# Patient Record
Sex: FEMALE | Race: BLACK OR AFRICAN AMERICAN | NOT HISPANIC OR LATINO | Employment: OTHER | URBAN - METROPOLITAN AREA
[De-identification: names, ages, dates, MRNs, and addresses within clinical notes are randomized per-mention and may not be internally consistent; named-entity substitution may affect disease eponyms.]

---

## 2024-05-10 ENCOUNTER — HOSPITAL ENCOUNTER (OUTPATIENT)
Dept: TELEMEDICINE | Facility: HOSPITAL | Age: 89
Discharge: HOME | End: 2024-05-10
Payer: MEDICARE

## 2024-05-10 DIAGNOSIS — I63.512 STROKE DUE TO OCCLUSION OF LEFT MIDDLE CEREBRAL ARTERY: Primary | ICD-10-CM

## 2024-05-10 PROBLEM — I63.312 CEREBROVASCULAR ACCIDENT (CVA) DUE TO THROMBOSIS OF LEFT MIDDLE CEREBRAL ARTERY: Status: ACTIVE | Noted: 2024-05-10

## 2024-05-10 PROBLEM — G81.91 RIGHT HEMIPLEGIA: Status: ACTIVE | Noted: 2024-05-10

## 2024-05-10 PROBLEM — I10 PRIMARY HYPERTENSION: Status: ACTIVE | Noted: 2024-05-10

## 2024-05-10 PROCEDURE — G0508 CRIT CARE TELEHEA CONSULT 60: HCPCS | Mod: GT,,, | Performed by: STUDENT IN AN ORGANIZED HEALTH CARE EDUCATION/TRAINING PROGRAM

## 2024-05-10 NOTE — SUBJECTIVE & OBJECTIVE
HPI:  89 y.o. female with a PMHx significant HTN, DM, prior stroke presenting with left MCA syndrome. Last seen completely normal last night     Fell this morning.   Neglect right side  No speech  Eye gazed to the left  No sensation  Right  facial droopo    No bleed    0615 made it to the bathroom, talking normally    Images personally reviewed and interpreted:  Study: {Study:60889}  Study Interpretation: ***     Assessment and plan:  ***    Lytics recommendation: {THROMBOLYTIC THERAPY RECOMMENDATION:65283}  Thrombectomy recommendation: {INTERVENTIONAL REVASCULARIZATION CANDIDATE:33714}  Placement recommendation: {TELESTROKE DISPOSITION RECOMMENDATION:37815}

## 2024-05-10 NOTE — TELEMEDICINE CONSULT
[facility]  Vascular Neurology  Comprehensive Stroke Center  TeleVascular Neurology Acute Consultation Note        Consult Information  Consults    Consulting Provider: JESSICA ROBLEDO   Current Providers  No providers found    Patient Location: [facility] Emergency Department    Spoke hospital nurse at bedside with patient assisting consultant.  Patient information was obtained from primary team.       Stroke Documentation  Acute Stroke Times   Last Known Normal Date: 05/09/24  Unknown Normal Time: Unknown Time  Symptom Onset Date: 05/09/24  Unknown Symptom Onset Time: Unknown Time  Stroke Team Called Date: 05/10/24  Stroke Team Called Time: 0739  Stroke Team Arrival Date: 05/10/24  Stroke Team Arrival Time: 0744  CT Interpretation Time: 0749  Thrombolytic Therapy Recommended: No    NIH Scale:  Interval: baseline  1a. Level of Consciousness: 1-->Not alert, but arousable by minor stimulation to obey, answer, or respond  1b. LOC Questions: 2-->Answers neither question correctly  1c. LOC Commands: 2-->Performs neither task correctly  2. Best Gaze: 2-->Forced deviation, or total gaze paresis not overcome by the oculocephalic maneuver  3. Visual: 1-->Partial hemianopia  4. Facial Palsy: 2-->Partial paralysis (total or near-total paralysis of lower face)  5a. Motor Arm, Left: 1-->Drift, limb holds 90 (or 45) degrees, but drifts down before full 10 seconds, does not hit bed or other support  5b. Motor Arm, Right: 4-->No movement  6a. Motor Leg, Left: 1-->Drift, leg falls by the end of the 5-sec period but does not hit bed  6b. Motor Leg, Right: 4-->No movement  7. Limb Ataxia: 0-->Absent  8. Sensory: 2-->Severe to total sensory loss, patient is not aware of being touched in the face, arm, and leg  9. Best Language: 3-->Mute, global aphasia, no usable speech or auditory comprehension  10. Dysarthria: 2-->Severe dysarthria, patients speech is so  slurred as to be unintelligible in the absence of or out of proportion to any dysphasia, or is mute/anarthric  11. Extinction and Inattention (formerly Neglect): 2-->Profound aaron-inattention/extinction more than 1 modality  Total (NIH Stroke Scale): 29      Modified Jamil: Score: 0  [place] Coma Scale:     ABCD2 Score:    [id number] Score:    HAS -BLED Score:    ICH Score:    Hunt & Bonner Classification:      There were no vitals taken for this visit.    Van Positive    Medical Decision Making  HPI:  89 y.o. female with a PMHx significant HTN, DM, prior stroke presenting with left MCA syndrome. Last seen completely normal last night per EMS. Patient did not feel well this morning, but was observed going to the bathroom, talking normally around 0615 this morning by family. Later found on the ground with symptoms.     She is on xarelto, last taken last night.      Images personally reviewed and interpreted:  Study: CT head  Study Interpretation: No acute intracranial hemorrhage. Hyperdense left MCA. Favorable ASPECTS.      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   Thrombectomy recommendation: Likely yes given her exam and CTH, though CTA head/neck is pending.   Placement recommendation: Patient will be transferred to the nearest thrombectomy-capable center (likely [place]). Recommend HOB flat if tolerated and IVF bolus prior to transfer. Allow for permissive HTN, SBP < 220.             ROS  Physical Exam  No past medical history on file.  No past surgical history on file.  No family history on file.    Diagnoses  Problem Noted   Stroke Due to Occlusion of Left Middle Cerebral Artery 5/10/2024       Madison Roberts MD      Emergent/Acute neurological consultation requested by spoke provider due to critical concerns for possible cerebrovascular event that could result in permanent  loss of neurologic/bodily function, severe disability or death of this patient.  Immediate/timely evaluation by a highly prepared expert is paramount for optimal outcomes  High risk for neurological deterioration if not properly diagnosed  High risk for neurological deterioration if not treated promplty/as soon as possible  Complex diagnostic evaluation may be required (advanced imaging)  High risk treatment options (thrombolytics and/or thrombectomy)    Patient care was coordinated with spoke provider, including but not limted to    Discussing likely diagnosis/etiology of symptoms  Making recommendations for further diagnostic studies  Making recommendations for intravenous thrombolytics or other advanced therapies  Making recommendations for disposition (admission/transfer for higher level of care)

## 2024-05-13 PROBLEM — G93.6 CYTOTOXIC CEREBRAL EDEMA: Status: ACTIVE | Noted: 2024-05-13

## 2024-05-13 PROBLEM — I25.10 CORONARY ARTERY DISEASE INVOLVING NATIVE CORONARY ARTERY OF NATIVE HEART WITHOUT ANGINA PECTORIS: Status: ACTIVE | Noted: 2024-05-13

## 2024-05-13 PROBLEM — G93.5 BRAIN COMPRESSION: Status: ACTIVE | Noted: 2024-05-13

## 2024-05-13 PROBLEM — R47.01 GLOBAL APHASIA: Status: ACTIVE | Noted: 2024-05-13

## 2024-05-13 PROBLEM — E78.5 HYPERLIPIDEMIA: Status: ACTIVE | Noted: 2024-05-13

## 2024-05-13 PROBLEM — E66.01 MORBID OBESITY DUE TO EXCESS CALORIES: Status: ACTIVE | Noted: 2024-05-13

## 2024-05-14 PROBLEM — E66.9 OBESITY (BMI 30-39.9): Status: ACTIVE | Noted: 2024-05-14

## 2024-05-14 PROBLEM — J44.9 CHRONIC OBSTRUCTIVE PULMONARY DISEASE: Status: ACTIVE | Noted: 2024-05-14

## 2024-05-14 PROBLEM — I25.10 CORONARY ARTERY DISEASE: Status: ACTIVE | Noted: 2024-05-14

## 2024-05-14 PROBLEM — Z95.1 HX OF CABG: Status: ACTIVE | Noted: 2024-05-14

## 2024-05-14 PROBLEM — R13.10 DYSPHAGIA: Status: ACTIVE | Noted: 2024-05-14

## 2024-05-14 PROBLEM — R14.0 ABDOMINAL DISTENSION: Status: ACTIVE | Noted: 2024-05-14

## 2024-05-14 PROBLEM — R64 INANITION: Status: ACTIVE | Noted: 2024-05-14

## 2024-05-14 PROBLEM — E13.69 OTHER SPECIFIED DIABETES MELLITUS WITH OTHER SPECIFIED COMPLICATION: Status: ACTIVE | Noted: 2024-05-14

## 2024-05-16 PROBLEM — K56.7 ILEUS, UNSPECIFIED: Status: ACTIVE | Noted: 2024-05-16

## 2024-05-21 PROBLEM — R09.02 OXYGEN DESATURATION: Status: ACTIVE | Noted: 2024-05-21

## 2024-05-23 PROBLEM — Z93.1 S/P PERCUTANEOUS ENDOSCOPIC GASTROSTOMY (PEG) TUBE PLACEMENT: Status: ACTIVE | Noted: 2024-05-23
